# Patient Record
Sex: MALE | Race: WHITE | URBAN - METROPOLITAN AREA
[De-identification: names, ages, dates, MRNs, and addresses within clinical notes are randomized per-mention and may not be internally consistent; named-entity substitution may affect disease eponyms.]

---

## 2022-05-10 ENCOUNTER — OFFICE VISIT (OUTPATIENT)
Dept: URBAN - METROPOLITAN AREA CLINIC 43 | Facility: CLINIC | Age: 87
End: 2022-05-10
Payer: COMMERCIAL

## 2022-05-10 DIAGNOSIS — H43.813 VITREOUS DEGENERATION, BILATERAL: ICD-10-CM

## 2022-05-10 DIAGNOSIS — H33.311 HORSESHOE TEAR OF RETINA WITHOUT DETACHMENT, RIGHT EYE: ICD-10-CM

## 2022-05-10 DIAGNOSIS — H25.813 COMBINED FORMS OF AGE-RELATED CATARACT, BILATERAL: ICD-10-CM

## 2022-05-10 DIAGNOSIS — H40.052 OCULAR HYPERTENSION, LEFT EYE: ICD-10-CM

## 2022-05-10 DIAGNOSIS — H04.123 DRY EYE SYNDROME OF BILATERAL LACRIMAL GLANDS: ICD-10-CM

## 2022-05-10 DIAGNOSIS — H35.373 PUCKERING OF MACULA, BILATERAL: Primary | ICD-10-CM

## 2022-05-10 PROCEDURE — 99204 OFFICE O/P NEW MOD 45 MIN: CPT | Performed by: OPTOMETRIST

## 2022-05-10 PROCEDURE — 92134 CPTRZ OPH DX IMG PST SGM RTA: CPT | Performed by: OPTOMETRIST

## 2022-05-10 RX ORDER — DORZOLAMIDE HYDROCHLORIDE AND TIMOLOL MALEATE 20; 5 MG/ML; MG/ML
SOLUTION/ DROPS OPHTHALMIC
Qty: 10 | Refills: 3 | Status: ACTIVE
Start: 2022-05-10

## 2022-05-10 ASSESSMENT — VISUAL ACUITY
OS: 20/70
OD: 20/40

## 2022-05-10 ASSESSMENT — KERATOMETRY
OD: 43.25
OS: 42.50

## 2022-05-10 ASSESSMENT — INTRAOCULAR PRESSURE
OS: 12
OD: 9

## 2022-05-10 NOTE — IMPRESSION/PLAN
Impression: Ocular hypertension, left eye: H40.052.  Plan: IOP low on cosopt BID OS only
mild cupping OS>OD
return 3-4 weeks for HVF/IOP/pachs/OCT RNFL

## 2022-05-10 NOTE — IMPRESSION/PLAN
Impression: Puckering of macula, bilateral: H35.373.  Plan: moderate retinal thickening OS>OD, NO CME on MAC OCT 
RTC for Retina consult next available

## 2022-05-10 NOTE — IMPRESSION/PLAN
Impression: Horseshoe tear of retina without detachment, right eye: H33.311. Plan: s/p laser.  retina attached

## 2022-05-23 ENCOUNTER — OFFICE VISIT (OUTPATIENT)
Dept: URBAN - METROPOLITAN AREA CLINIC 13 | Facility: CLINIC | Age: 87
End: 2022-05-23
Payer: MEDICARE

## 2022-05-23 DIAGNOSIS — H33.191 OTHER RETINOSCHISIS AND RETINAL CYSTS, RIGHT EYE: ICD-10-CM

## 2022-05-23 DIAGNOSIS — H25.13 AGE-RELATED NUCLEAR CATARACT, BILATERAL: ICD-10-CM

## 2022-05-23 DIAGNOSIS — H53.002 AMBLYOPIA OF LEFT EYE: ICD-10-CM

## 2022-05-23 DIAGNOSIS — H33.301 RETINAL BREAK OF RIGHT EYE: ICD-10-CM

## 2022-05-23 PROCEDURE — 99204 OFFICE O/P NEW MOD 45 MIN: CPT | Performed by: OPHTHALMOLOGY

## 2022-05-23 PROCEDURE — 92134 CPTRZ OPH DX IMG PST SGM RTA: CPT | Performed by: OPHTHALMOLOGY

## 2022-05-23 ASSESSMENT — INTRAOCULAR PRESSURE
OD: 16
OS: 19

## 2022-05-23 NOTE — IMPRESSION/PLAN
Impression: Age-related nuclear cataract, bilateral: H25.13. Plan: NVS. Obs. Patient aware that vitrectomy will cause increased cataract growth and likely need for CE/IOL.

## 2022-05-23 NOTE — IMPRESSION/PLAN
Impression: Puckering of macula, bilateral: H35.373. OCT OU = ERM OS > OD  / 565 Plan: OD: in setting of AMD and periph schisis OS: in setting of AMD and ablyopia There is an epiretinal membrane (ERM) that appears significant, but with AMD OU and ablyopia OS. We discussed the natural history as well as the risk and benefits of observation versus vitrectomy with membrane peeling. The patient understands that with vitrectomy, the vision can improve, but does not improve fully and sometimes the vision does not improve at all. Also, it can take months to years for the vision to improve. The risks of vitrectomy include but are not limited to infection, retinal tear or detachment, glaucoma, cataract formation in a phakic patient, hemorrhage, loss of eye and blindness, recurrent epiretinal membranes, need for additional surgery, and chronic cystoid macular edema. The patient elects to obs for now. 

PLAN: 3m OCT OU x ERM

## 2022-05-23 NOTE — IMPRESSION/PLAN
Impression: Retinal break of right eye: H33.301. Plan: Retinal break well surrounded by laser. No new tears, extension of break, or retinal detachment seen. No CME on exam.
Retinal detachment warnings given. Call ASAP with changes.

## 2022-05-23 NOTE — IMPRESSION/PLAN
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 Plan: The patient has retinoschisis. Discussed with patient that retinoschisis can and does stay stable for years. We discussed that there is a small risk of progression/conversion to retinal detachment. Recommend close observation for now. We reviewed retinal detachment precautions, and patient knows to call ASAP with any changes.

## 2022-05-23 NOTE — IMPRESSION/PLAN
Impression: Vitreous degeneration, bilateral: H43.813. Plan: OD: Please see above. OS:  No retinal breaks were identified on exam.  The findings were discussed with the patient. The signs and symptoms of a retinal tear and detachment were again reviewed with the patient. The patient was advised to return if they noted any new floaters, flashing lights or a shadow in their vision.

## 2022-08-15 ENCOUNTER — OFFICE VISIT (OUTPATIENT)
Dept: URBAN - METROPOLITAN AREA CLINIC 13 | Facility: CLINIC | Age: 87
End: 2022-08-15
Payer: MEDICARE

## 2022-08-15 DIAGNOSIS — H35.373 PUCKERING OF MACULA, BILATERAL: Primary | ICD-10-CM

## 2022-08-15 DIAGNOSIS — H53.002 AMBLYOPIA OF LEFT EYE: ICD-10-CM

## 2022-08-15 DIAGNOSIS — H33.301 RETINAL BREAK OF RIGHT EYE: ICD-10-CM

## 2022-08-15 DIAGNOSIS — H35.3132 NONEXUDATIVE AGE-RELATED MACULAR DEGENERATION, BILATERAL, INTERMEDIATE DRY STAGE: ICD-10-CM

## 2022-08-15 DIAGNOSIS — H25.13 AGE-RELATED NUCLEAR CATARACT, BILATERAL: ICD-10-CM

## 2022-08-15 DIAGNOSIS — H43.813 VITREOUS DEGENERATION, BILATERAL: ICD-10-CM

## 2022-08-15 DIAGNOSIS — H33.191 OTHER RETINOSCHISIS AND RETINAL CYSTS, RIGHT EYE: ICD-10-CM

## 2022-08-15 PROCEDURE — 99213 OFFICE O/P EST LOW 20 MIN: CPT | Performed by: OPHTHALMOLOGY

## 2022-08-15 PROCEDURE — 92134 CPTRZ OPH DX IMG PST SGM RTA: CPT | Performed by: OPHTHALMOLOGY

## 2022-08-15 ASSESSMENT — INTRAOCULAR PRESSURE
OD: 12
OS: 17

## 2022-08-15 NOTE — IMPRESSION/PLAN
Impression: Vitreous degeneration, bilateral: H43.813. Plan: OD: Please see above. OS:  No retinal breaks were identified on exam.  The findings were discussed with the patient. The signs and symptoms of a retinal tear and detachment were again reviewed with the patient. The patient was advised to return if they noted any new floaters, flashing lights or a shadow in their vision. 80

## 2022-08-15 NOTE — IMPRESSION/PLAN
Impression: Puckering of macula, bilateral: H35.373. OCT OU = ERM OS > OD  / 988 Plan: OD: in setting of AMD and periph schisis OS: in setting of AMD and amblyopia There is an epiretinal membrane (ERM) that appears significant, but with AMD OU and ablyopia OS and patient remains relatively happy with his vision. RBA's d/w patient. He elects obs. 

6m OCT OU x ERM

## 2023-02-13 ENCOUNTER — OFFICE VISIT (OUTPATIENT)
Dept: URBAN - METROPOLITAN AREA CLINIC 13 | Facility: CLINIC | Age: 88
End: 2023-02-13
Payer: MEDICARE

## 2023-02-13 DIAGNOSIS — H40.052 OCULAR HYPERTENSION, LEFT EYE: ICD-10-CM

## 2023-02-13 DIAGNOSIS — D31.32 BENIGN NEOPLASM OF LEFT CHOROID: ICD-10-CM

## 2023-02-13 DIAGNOSIS — H43.813 VITREOUS DEGENERATION, BILATERAL: ICD-10-CM

## 2023-02-13 DIAGNOSIS — H33.301 RETINAL BREAK OF RIGHT EYE: ICD-10-CM

## 2023-02-13 DIAGNOSIS — H35.3132 NONEXUDATIVE AGE-RELATED MACULAR DEGENERATION, BILATERAL, INTERMEDIATE DRY STAGE: ICD-10-CM

## 2023-02-13 DIAGNOSIS — H35.373 PUCKERING OF MACULA, BILATERAL: Primary | ICD-10-CM

## 2023-02-13 DIAGNOSIS — H53.002 AMBLYOPIA OF LEFT EYE: ICD-10-CM

## 2023-02-13 DIAGNOSIS — H33.191 OTHER RETINOSCHISIS AND RETINAL CYSTS, RIGHT EYE: ICD-10-CM

## 2023-02-13 DIAGNOSIS — H25.13 AGE-RELATED NUCLEAR CATARACT, BILATERAL: ICD-10-CM

## 2023-02-13 PROCEDURE — 92134 CPTRZ OPH DX IMG PST SGM RTA: CPT | Performed by: OPHTHALMOLOGY

## 2023-02-13 PROCEDURE — 99213 OFFICE O/P EST LOW 20 MIN: CPT | Performed by: OPHTHALMOLOGY

## 2023-02-13 RX ORDER — DORZOLAMIDE HYDROCHLORIDE AND TIMOLOL MALEATE 20; 5 MG/ML; MG/ML
SOLUTION/ DROPS OPHTHALMIC
Qty: 10 | Refills: 3 | Status: INACTIVE
Start: 2023-02-13 | End: 2023-03-14

## 2023-02-13 ASSESSMENT — INTRAOCULAR PRESSURE
OS: 7
OD: 6

## 2023-02-13 NOTE — IMPRESSION/PLAN
Impression: Ocular hypertension, left eye: H40.052. Plan: IOP stable on cosopt BID OS only CSM will call in

## 2023-02-13 NOTE — IMPRESSION/PLAN
Impression: Puckering of macula, bilateral: H35.373. OCT OU = ERM OS > OD  / 547  Plan: OD: in setting of AMD and periph schisis OS: in setting of AMD and amblyopia There is an epiretinal membrane (ERM) that appears significant, but with AMD OU and ablyopia OS and patient remains relatively happy with his vision. RBA's d/w patient. He elects obs. 

6m OCT OU x ERM